# Patient Record
Sex: MALE | Race: WHITE
[De-identification: names, ages, dates, MRNs, and addresses within clinical notes are randomized per-mention and may not be internally consistent; named-entity substitution may affect disease eponyms.]

---

## 2017-10-01 ENCOUNTER — HOSPITAL ENCOUNTER (EMERGENCY)
Dept: HOSPITAL 47 - EC | Age: 22
Discharge: HOME | End: 2017-10-01
Payer: COMMERCIAL

## 2017-10-01 VITALS
TEMPERATURE: 98.3 F | DIASTOLIC BLOOD PRESSURE: 94 MMHG | RESPIRATION RATE: 20 BRPM | SYSTOLIC BLOOD PRESSURE: 143 MMHG | HEART RATE: 60 BPM

## 2017-10-01 DIAGNOSIS — H66.92: Primary | ICD-10-CM

## 2017-10-01 DIAGNOSIS — F17.200: ICD-10-CM

## 2017-10-01 PROCEDURE — 99283 EMERGENCY DEPT VISIT LOW MDM: CPT

## 2017-10-01 NOTE — ED
Pediatric HENT HPI





- General


Chief Complaint: ENT


Stated Complaint: L ear pain


Time Seen by Provider: 10/01/17 21:45


Source: patient, RN notes reviewed, old records reviewed


Mode of arrival: ambulatory


Limitations: no limitations





- History of Present Illness


Initial Comments: 





Pt is a 22 year old male with CC of left ear pain for one week. Patient reports 

that he felt  a popping and fullness in the ear, denies taking motrin or 

tylenol. Patient reports decreased hearing in left ear, and states that his 

neck feels tender and swollen. Patient denies any sore throat, and denies known 

fever. 





- Related Data


 Previous Rx's











 Medication  Instructions  Recorded


 


Amoxicillin 500 mg PO Q8H #21 capsule 10/01/17


 


Ciprofloxacin Ophth Soln [Ciloxan 5 drops LEFT EAR BID #1 bottle 10/01/17





0.3% Ophth Soln]  











 Allergies











Allergy/AdvReac Type Severity Reaction Status Date / Time


 


No Known Allergies Allergy   Verified 10/01/17 21:46














Review of Systems


ROS Statement: 


Those systems with pertinent positive or pertinent negative responses have been 

documented in the HPI.





ROS Other: All systems not noted in ROS Statement are negative.


Constitutional: Denies: chills


Eyes: Denies: eye pain, vision change


ENT: Reports: ear pain, hearing loss.  Denies: throat pain, dental pain, 

epistaxis


Respiratory: Denies: cough, dyspnea


Cardiovascular: Denies: chest pain, palpitations, dyspnea on exertion


Endocrine: Denies: fatigue


Gastrointestinal: Denies: abdominal pain, nausea


Genitourinary: Denies: urgency, dysuria


Musculoskeletal: Denies: back pain


Skin: Denies: rash, lesions


Neurological: Denies: headache, weakness


Psychiatric: Denies: anxiety, depression


Hematological/Lymphatic: Denies: easy bleeding





Past Medical History


Past Medical History: No Reported History


History of Any Multi-Drug Resistant Organisms: None Reported


Past Surgical History: No Surgical Hx Reported


Past Psychological History: No Psychological Hx Reported


Smoking Status: Current every day smoker


Past Alcohol Use History: Occasional


Past Drug Use History: None Reported





General Exam





- General Exam Comments


Initial Comments: 





This is a 22 year old male, no distress. 


Limitations: no limitations


General appearance: alert, in no apparent distress


Head exam: Present: atraumatic, normocephalic, normal inspection


Eye exam: Present: normal appearance, PERRL, EOMI.  Absent: scleral icterus, 

conjunctival injection, periorbital swelling


ENT exam: Present: normal oropharynx, mucous membranes moist, other (tender 

over left tragus).  Absent: normal exam, TM's normal bilaterally (left TM 

erythema and effusion)


Neck exam: Present: normal inspection.  Absent: tenderness, meningismus, 

lymphadenopathy


Respiratory exam: Present: normal lung sounds bilaterally.  Absent: respiratory 

distress, wheezes, rales, rhonchi, stridor


Cardiovascular Exam: Present: regular rate, normal rhythm, normal heart sounds.

  Absent: systolic murmur, diastolic murmur, rubs, gallop, clicks


GI/Abdominal exam: Present: soft, normal bowel sounds.  Absent: distended, 

tenderness, guarding, rebound, rigid


Neurological exam: Present: alert, oriented X3, CN II-XII intact


Psychiatric exam: Present: normal affect, normal mood


Skin exam: Present: warm, dry, intact, normal color.  Absent: rash





Course


 Vital Signs











  10/01/17





  21:46


 


Temperature 98.3 F


 


Pulse Rate 60


 


Respiratory 20





Rate 


 


Blood Pressure 143/94


 


O2 Sat by Pulse 100





Oximetry 














Medical Decision Making





- Medical Decision Making


Pt is a 22 year old male with CC of left ear pain for one week. Patient reports 

that he felt  a popping and fullness in the ear, denies taking motrin or 

tylenol. Patient reports decreased hearing in left ear, and states that his 

neck feels tender and swollen.  Patient has erythematous left TM with effusion, 

patient is tender over tragus. Patient placed on amoxil for otitis media. 

Discussed taking decongestant medication.Return parameters discussed. 








Disposition


Clinical Impression: 


 Left otitis media





Disposition: HOME SELF-CARE


Condition: Good


Instructions:  Otitis Media (ED)


Additional Instructions: 


patient advised to  over-the-counter decongestant medicine such as 

Tylenol Cold and sinus.  Take antibiotic as prescribed.  Also apply the 

antibiotic drops twice a day.  Return to the emergency department if any 

alarming signs or symptoms occur.


Prescriptions: 


Amoxicillin 500 mg PO Q8H #21 capsule


Ciprofloxacin Ophth Soln [Ciloxan 0.3% Ophth Soln] 5 drops LEFT EAR BID #1 

bottle


Referrals: 


None,Stated [Primary Care Provider] - 1-2 days


Time of Disposition: 22:23

## 2017-10-03 NOTE — CDI
Dear Neela Blood PA-C:



Please do addendum History of Present Illness, Physical Examination, and 
Medical Decision Making.



Thank you,



Sheron ANN,

.

If you have any questions, please contact  at [phone].
[facility]

## 2018-03-25 ENCOUNTER — HOSPITAL ENCOUNTER (EMERGENCY)
Dept: HOSPITAL 47 - EC | Age: 23
Discharge: HOME | End: 2018-03-25
Payer: COMMERCIAL

## 2018-03-25 VITALS — DIASTOLIC BLOOD PRESSURE: 60 MMHG | TEMPERATURE: 98.4 F | HEART RATE: 68 BPM | SYSTOLIC BLOOD PRESSURE: 118 MMHG

## 2018-03-25 VITALS — RESPIRATION RATE: 18 BRPM

## 2018-03-25 DIAGNOSIS — F17.200: ICD-10-CM

## 2018-03-25 DIAGNOSIS — Z20.828: ICD-10-CM

## 2018-03-25 DIAGNOSIS — Z53.8: ICD-10-CM

## 2018-03-25 DIAGNOSIS — J06.9: Primary | ICD-10-CM

## 2018-03-25 PROCEDURE — 71046 X-RAY EXAM CHEST 2 VIEWS: CPT

## 2018-03-25 PROCEDURE — 99283 EMERGENCY DEPT VISIT LOW MDM: CPT

## 2018-03-25 PROCEDURE — 87502 INFLUENZA DNA AMP PROBE: CPT

## 2018-03-25 RX ADMIN — OSELTAMIVIR PHOSPHATE STA MG: 75 CAPSULE ORAL at 22:02

## 2018-03-25 RX ADMIN — OSELTAMIVIR PHOSPHATE STA: 75 CAPSULE ORAL at 22:05

## 2018-03-25 NOTE — ED
URI HPI





- General


Chief Complaint: Upper Respiratory Infection


Stated Complaint: flu/burn on arm


Time Seen by Provider: 03/25/18 19:52


Source: patient, RN notes reviewed


Mode of arrival: ambulatory


Limitations: no limitations





- History of Present Illness


Initial Comments: 





This is a 22-year-old male who states he has a cough of green phlegm no overt 

fevers chills or sweats he does believe he may have contracted the fluids his 

son was diagnosed with a week ago.  No earache sore throat or rhinorrhea at 

this time no other symptoms to report.


MD Complaint: cough





- Related Data


 Previous Rx's











 Medication  Instructions  Recorded


 


Oseltamivir [Tamiflu] 75 mg PO Q12HR #14 cap 03/25/18











 Allergies











Allergy/AdvReac Type Severity Reaction Status Date / Time


 


No Known Allergies Allergy   Verified 03/25/18 20:34














Review of Systems


ROS Statement: 


Those systems with pertinent positive or pertinent negative responses have been 

documented in the HPI.





ROS Other: All systems not noted in ROS Statement are negative.





Past Medical History


Past Medical History: No Reported History


History of Any Multi-Drug Resistant Organisms: None Reported


Past Surgical History: No Surgical Hx Reported


Past Psychological History: No Psychological Hx Reported


Smoking Status: Current every day smoker


Past Alcohol Use History: Occasional


Past Drug Use History: None Reported





General Exam





- General Exam Comments


Initial Comments: 





This is a well-developed well-nourished awake alert oriented 3 male


Limitations: no limitations


General appearance: alert, in no apparent distress


Head exam: Present: atraumatic, normocephalic, normal inspection


Eye exam: Present: normal appearance, PERRL, EOMI.  Absent: scleral icterus, 

conjunctival injection, periorbital swelling


ENT exam: Present: normal exam, mucous membranes moist, other (Boggy nasal 

mucosa)


Neck exam: Present: normal inspection.  Absent: tenderness, meningismus, 

lymphadenopathy


Respiratory exam: Present: normal lung sounds bilaterally.  Absent: respiratory 

distress, wheezes, rales, rhonchi, stridor


Cardiovascular Exam: Present: regular rate, normal rhythm, normal heart sounds.

  Absent: systolic murmur, diastolic murmur, rubs, gallop, clicks


GI/Abdominal exam: Present: soft, normal bowel sounds.  Absent: distended, 

tenderness, guarding, rebound, rigid


Extremities exam: Present: normal inspection, full ROM, normal capillary 

refill.  Absent: tenderness, pedal edema, joint swelling, calf tenderness


Back exam: Present: normal inspection


Neurological exam: Present: alert, oriented X3, CN II-XII intact


Psychiatric exam: Present: normal affect, normal mood


Skin exam: Present: warm, dry, intact, normal color.  Absent: rash





Course


 Vital Signs











  03/25/18





  19:46


 


Temperature 99.1 F


 


Pulse Rate 73


 


Respiratory 18





Rate 


 


Blood Pressure 127/70


 


O2 Sat by Pulse 99





Oximetry 














Medical Decision Making





- Medical Decision Making





I did discuss findings with patient family the patient is exposed to influenza 

he has what appears be the beginning symptoms so his initial test was negative 

he'll be placed on for prophylaxis.  He does have a small infant at home.





- Lab Data


 Lab Results











  03/25/18 Range/Units





  20:30 


 


Influenza Type A RNA  Not Detected  (Not Detectd)  


 


Influenza Type B (PCR)  Not Detected  (Not Detectd)  














- Radiology Data


Radiology results: report reviewed (I did review the imaging and reports no 

acute findings.), image reviewed





Disposition


Clinical Impression: 


 Upper respiratory infection, Exposure to influenza





Disposition: HOME SELF-CARE


Condition: Good


Instructions:  Upper Respiratory Infection (ED), Influenza (ED)


Prescriptions: 


Oseltamivir [Tamiflu] 75 mg PO Q12HR #14 cap


Referrals: 


None,Stated [Primary Care Provider] - 1-2 days

## 2018-03-25 NOTE — XR
EXAMINATION: XR chest 2V

DATE AND TIME:  3/25/2018 8:23 PM

 

ORDERING PROVIDER: Marcus Landis MD

 

CLINICAL INDICATION: cough

 

TECHNIQUE: PA and lateral

 

COMPARISON: None.

 

DESCRIPTION: The lungs are clear. The pleural spaces are negative. The cardiac silhouette is not enla
rged. The mediastinal and pleural silhouettes are unremarkable. The skeletal structures are intact wi
thout focal findings. The soft tissues are unremarkable.

 

IMPRESSION:

NO ACUTE PROCESS.

## 2021-10-20 ENCOUNTER — HOSPITAL ENCOUNTER (EMERGENCY)
Dept: HOSPITAL 47 - EC | Age: 26
Discharge: HOME | End: 2021-10-20
Payer: COMMERCIAL

## 2021-10-20 VITALS
DIASTOLIC BLOOD PRESSURE: 78 MMHG | RESPIRATION RATE: 18 BRPM | TEMPERATURE: 98 F | SYSTOLIC BLOOD PRESSURE: 132 MMHG | HEART RATE: 79 BPM

## 2021-10-20 DIAGNOSIS — F17.200: ICD-10-CM

## 2021-10-20 DIAGNOSIS — K04.7: Primary | ICD-10-CM

## 2021-10-20 PROCEDURE — 99282 EMERGENCY DEPT VISIT SF MDM: CPT

## 2021-10-20 NOTE — ED
General Adult HPI





- General


Chief complaint: Dental/Oral


Stated complaint: Dental Pain


Time Seen by Provider: 10/20/21 22:04


Source: patient


Mode of arrival: ambulatory


Limitations: no limitations





- History of Present Illness


Initial comments: 


26-year-old male patient presents the emergency department today for evaluation 

of left upper until pain and facial swelling.  Patient states symptoms started 

about 5 days ago, swelling started today.  Denies any trismus or difficulty 

swallowing.  Denies any fever or chills.  Denies nausea or vomiting.  States his

been taken ibuprofen without relief.  They have been calling around to multiple 

dentists and unable to get him in anywhere.  They're calling daily to the 

[facility] dental clinic for an appointment.





- Related Data


                                  Previous Rx's











 Medication  Instructions  Recorded


 


Oseltamivir [Tamiflu] 75 mg PO Q12HR #14 cap 03/25/18


 


Amoxic-Pot Clav 875-125Mg 1 tab PO Q12HR #20 tablet 10/20/21





[Augmentin 875-125]  


 


Ibuprofen [Motrin] 600 mg PO Q8HR PRN #30 tab 10/20/21











                                    Allergies











Allergy/AdvReac Type Severity Reaction Status Date / Time


 


No Known Allergies Allergy   Verified 10/20/21 21:59














Review of Systems


ROS Statement: 


Those systems with pertinent positive or pertinent negative responses have been 

documented in the HPI.





ROS Other: All systems not noted in ROS Statement are negative.





Past Medical History


Past Medical History: No Reported History


History of Any Multi-Drug Resistant Organisms: None Reported


Past Surgical History: No Surgical Hx Reported


Past Psychological History: No Psychological Hx Reported


Smoking Status: Current some day smoker


Past Alcohol Use History: Occasional


Past Drug Use History: None Reported





General Exam


Limitations: no limitations


General appearance: alert, in no apparent distress, other (This is a well-

developed, well-nourished adult male patient in no acute distress.  Vital signs 

upon presentation are temperature 100.1F, pulse 89, respirations 19, blood 

pressure 135/75, pulse ox 97% on room air.)


Eye exam: Present: normal appearance, PERRL, EOMI.  Absent: scleral icterus, 

conjunctival injection, periorbital swelling


ENT exam: Present: normal oropharynx, mucous membranes moist, other (There are 

multiple dental caries noted to the left upper dentition.  There is no evidence 

for drainable abscess.)


Cardiovascular Exam: Present: regular rate, normal rhythm, normal heart sounds. 

 Absent: systolic murmur, diastolic murmur, rubs, gallop, clicks


GI/Abdominal exam: Present: soft, normal bowel sounds.  Absent: distended, 

tenderness, guarding, rebound, rigid


Neurological exam: Present: alert, oriented X3, CN II-XII intact


Psychiatric exam: Present: normal affect, normal mood


Skin exam: Present: warm, dry, intact, normal color.  Absent: rash





Course


                                   Vital Signs











  10/20/21 10/20/21





  21:56 22:22


 


Temperature 100.1 F H 98 F


 


Pulse Rate 89 79


 


Respiratory 19 18





Rate  


 


Blood Pressure 135/75 132/78


 


O2 Sat by Pulse 97 97





Oximetry  














Medical Decision Making





- Medical Decision Making


26-year-old male patient presents to the emergency department today for 

evaluation of left upper dental pain and facial swelling.  No evidence for 

drainable abscess on exam.  He does have mildly elevated temperature 100.1.  He 

was started on antibiotics here.  Given pain medication.  He was also given 

recommendation to call the [facility] as a do except a wide variety 

of insurances.  He is instructed to continue colonic [facility] dental clinic 

daily for an emergency appointment.  Return parameters were discussed in detail.

  He verbalizes understanding and agrees with this plan.  My attending is Dr. Beth. 








Disposition


Clinical Impression: 


 Dental abscess





Disposition: HOME SELF-CARE


Condition: Good


Instructions (If sedation given, give patient instructions):  Dental Abscess 

(ED)


Additional Instructions: 


Take edications as directed. Complete antibiotic prescription in full. Follow-up

 with dentistry as soon as possible.  Return to the emergency department for any

 new, worsening, or concerning symptoms.


Prescriptions: 


Amoxic-Pot Clav 875-125Mg [Augmentin 875-125] 1 tab PO Q12HR #20 tablet


Ibuprofen [Motrin] 600 mg PO Q8HR PRN #30 tab


 PRN Reason: Pain


Is patient prescribed a controlled substance at d/c from ED?: No


Referrals: 


None,Stated [Primary Care Provider] - 1-2 days


Time of Disposition: 22:14

## 2022-06-03 ENCOUNTER — HOSPITAL ENCOUNTER (EMERGENCY)
Dept: HOSPITAL 47 - EC | Age: 27
Discharge: HOME | End: 2022-06-03
Payer: COMMERCIAL

## 2022-06-03 VITALS — SYSTOLIC BLOOD PRESSURE: 113 MMHG | HEART RATE: 73 BPM | DIASTOLIC BLOOD PRESSURE: 74 MMHG | RESPIRATION RATE: 18 BRPM

## 2022-06-03 VITALS — TEMPERATURE: 98.5 F

## 2022-06-03 DIAGNOSIS — F17.200: ICD-10-CM

## 2022-06-03 DIAGNOSIS — T15.02XA: Primary | ICD-10-CM

## 2022-06-03 PROCEDURE — 65220 REMOVE FOREIGN BODY FROM EYE: CPT

## 2022-06-03 PROCEDURE — 99283 EMERGENCY DEPT VISIT LOW MDM: CPT

## 2022-06-03 NOTE — ED
Eye Problem HPI





- General


Chief complaint: Eye Problems


Stated complaint: Left Eye pain


Time Seen by Provider: 06/03/22 13:16


Source: patient, RN notes reviewed


Mode of arrival: ambulatory


Limitations: no limitations





- History of Present Illness


Initial comments: 


27-year-old male presents emergency Department with chief complaint of left eye 

foreign body, left eye irritation.  Patient states he felt like something has 

been his left eye for 1 week.  He states he has no visual disturbance states it 

is sensitive to light, feels like something is in his left eye states that there

is irritation, tearing of his eye.  His tetanus is up-to-date patient offers no 

complaints.








- Related Data


                                  Previous Rx's











 Medication  Instructions  Recorded


 


Oseltamivir [Tamiflu] 75 mg PO Q12HR #14 cap 03/25/18


 


Amoxic-Pot Clav 875-125Mg 1 tab PO Q12HR #20 tablet 10/20/21





[Augmentin 875-125]  


 


Ibuprofen [Motrin] 600 mg PO Q8HR PRN #30 tab 10/20/21











                                    Allergies











Allergy/AdvReac Type Severity Reaction Status Date / Time


 


No Known Allergies Allergy   Verified 06/03/22 13:13














Review of Systems


ROS Statement: 


Those systems with pertinent positive or pertinent negative responses have been 

documented in the HPI.





ROS Other: All systems not noted in ROS Statement are negative.





Past Medical History


Past Medical History: No Reported History


History of Any Multi-Drug Resistant Organisms: None Reported


Past Surgical History: No Surgical Hx Reported


Past Psychological History: No Psychological Hx Reported


Smoking Status: Current some day smoker


Past Alcohol Use History: Occasional


Past Drug Use History: None Reported





General Exam


Limitations: no limitations


General appearance: alert, in no apparent distress


Head exam: Present: atraumatic, normocephalic, normal inspection


Eye exam: Present: PERRL, EOMI, conjunctival injection (Left), other (Foreign-

body central region left eye, patient has fluorescein uptake with Wood lamp, 

patient for relief of symptoms after proparacaine drops).  Absent: normal 

appearance, scleral icterus, periorbital swelling


ENT exam: Present: normal exam, normal oropharynx, mucous membranes moist, TM's 

normal bilaterally


Neck exam: Present: normal inspection.  Absent: tenderness, meningismus, 

lymphadenopathy


Respiratory exam: Present: normal lung sounds bilaterally.  Absent: respiratory 

distress, wheezes, rales, rhonchi, stridor


Cardiovascular Exam: Present: regular rate, normal rhythm, normal heart sounds. 

 Absent: systolic murmur, diastolic murmur, rubs, gallop, clicks





Course


                                   Vital Signs











  06/03/22





  13:10


 


Temperature 98.5 F


 


Pulse Rate 80


 


Respiratory 20





Rate 


 


Blood Pressure 118/67


 


O2 Sat by Pulse 99





Oximetry 














Procedures





- Forgein Body Removal Eye


Site: Left


Anesthetic Used: Proparacaine


Eye Exam Technique: Woods Lamp


Foreign Body Suspected: Other


Forgein Body Removal Technique: Cotton Swab


Remaining Debris: No


Patient Tolerated: no complications





Medical Decision Making





- Medical Decision Making





Foreign body was removed from left eye, there is a corneal defect patient was 

placed on Tobrex eyedrops patient will follow-up with on-call ophthalmology 

return parameters were discussed.





Disposition


Clinical Impression: 


 Foreign body in cornea, left eye, initial encounter





Disposition: HOME SELF-CARE


Condition: Stable


Instructions (If sedation given, give patient instructions):  Corneal Abrasion 

(ED), Eye Foreign Body (ED)


Additional Instructions: 


Use Tobrex eyedrops 1 drop every 4 hours for 5 days.Please return to the 

Emergency Department if symptoms worsen or any other concerns.


Is patient prescribed a controlled substance at d/c from ED?: No


Referrals: 


None,Stated [Primary Care Provider] - 1-2 days


Isael Amanda MD [STAFF PHYSICIAN] - 1-2 days


Time of Disposition: 13:34

## 2022-09-09 ENCOUNTER — HOSPITAL ENCOUNTER (EMERGENCY)
Dept: HOSPITAL 47 - EC | Age: 27
Discharge: HOME | End: 2022-09-09
Payer: COMMERCIAL

## 2022-09-09 VITALS
HEART RATE: 64 BPM | SYSTOLIC BLOOD PRESSURE: 120 MMHG | TEMPERATURE: 98 F | RESPIRATION RATE: 20 BRPM | DIASTOLIC BLOOD PRESSURE: 65 MMHG

## 2022-09-09 DIAGNOSIS — F17.200: ICD-10-CM

## 2022-09-09 DIAGNOSIS — K08.89: Primary | ICD-10-CM

## 2022-09-09 PROCEDURE — 99282 EMERGENCY DEPT VISIT SF MDM: CPT

## 2022-09-09 NOTE — ED
General Adult HPI





- General


Chief complaint: Dental/Oral


Stated complaint: dental pain


Time Seen by Provider: 09/09/22 12:37


Source: patient


Mode of arrival: ambulatory


Limitations: no limitations





- History of Present Illness


Initial comments: 


Dictation was produced using dragon dictation software. please excuse any 

grammatical, word or spelling errors. 











Chief Complaint: 27-year-old male presents to the emergency department for 

dental pain





History of Present Illness: Patient is a 27-year-old male with no significant 

comorbidities.  Presents emergency Department with dental pain.  Patient states 

that #17 hurts.  It's been hurting for 7 days.  Patient is a history of poor 

dentition and dental extractions in the past.  Denies any fever or facial pain. 

Patient had his significant other called multiple dental offices and 

appointments have been delayed for up to 3 weeks.  Patient spent on antibiotics 

in the past for dental pain.








The ROS documented in this emergency department record has been reviewed and 

confirmed by me.  Those systems with pertinent positive or negative responses 

have been documented in the HPI.  All other systems are other negative and/or 

noncontributory.








PHYSICAL EXAM:


General Impression: Alert and oriented x3, not in acute distress


HEENT: Normocephalic atraumatic, extra-ocular movements intact, pupils equal and

reactive to light bilaterally, mucous membranes moist.


Oral exam: Poor dentition, no drainable abscess in the lower left mandibular 

gingiva


Cardiovascular: Heart regular rate and rhythm


Chest: Able to complete full sentences, no retractions, no tachypnea


Musculoskeletal: Pulses present and equal in all extremities, no peripheral 

edema


Motor:  no focal deficits noted


Neurological: CN II-XII grossly intact, no focal motor or sensory deficits noted


Skin: Intact with no visualized rashes


Psych: Normal affect and mood





ED course: 27-year-old male presents to the emergency department for dental 

pain.  Vital signs upon arrival are within acceptable limits.  Patient in no 

acute distress.  Patient given antibiotics.  Advised to follow up with Dentist. 

Patient given prescription for antibiotics and analgesics.  

















- Related Data


                                  Previous Rx's











 Medication  Instructions  Recorded


 


Oseltamivir [Tamiflu] 75 mg PO Q12HR #14 cap 03/25/18


 


Amoxic-Pot Clav 875-125Mg 1 tab PO Q12HR #20 tablet 10/20/21





[Augmentin 875-125]  


 


Ibuprofen [Motrin] 600 mg PO Q8HR PRN #30 tab 10/20/21


 


Amoxic-Pot Clav 875-125Mg 1 tab PO BID 21 Days #42 tab 09/09/22





[Augmentin 875-125]  


 


HYDROcodone/APAP 5-325MG [Norco 1 tab PO Q6HR PRN 3 Days #12 tab 09/09/22





5-325]  











                                    Allergies











Allergy/AdvReac Type Severity Reaction Status Date / Time


 


No Known Allergies Allergy   Verified 09/09/22 12:36














Review of Systems


ROS Statement: 


Those systems with pertinent positive or pertinent negative responses have been 

documented in the HPI.





ROS Other: All systems not noted in ROS Statement are negative.





Past Medical History


Past Medical History: No Reported History


History of Any Multi-Drug Resistant Organisms: None Reported


Past Surgical History: No Surgical Hx Reported


Past Psychological History: No Psychological Hx Reported


Smoking Status: Current every day smoker


Past Alcohol Use History: Occasional


Past Drug Use History: Marijuana





General Exam


Limitations: no limitations





Course





                                   Vital Signs











  09/09/22





  12:35


 


Temperature 98 F


 


Pulse Rate 64


 


Respiratory 20





Rate 


 


Blood Pressure 120/65


 


O2 Sat by Pulse 99





Oximetry 














Disposition


Clinical Impression: 


 Pain, dental





Disposition: HOME SELF-CARE


Condition: Fair


Instructions (If sedation given, give patient instructions):  Toothache (ED)


Prescriptions: 


Amoxic-Pot Clav 875-125Mg [Augmentin 875-125] 1 tab PO BID 21 Days #42 tab


HYDROcodone/APAP 5-325MG [Norco 5-325] 1 tab PO Q6HR PRN 3 Days #12 tab


 PRN Reason: Severe Pain


Is patient prescribed a controlled substance at d/c from ED?: Yes


If prescribed controlled substance>3 days was MAPS reviewed?: Prescribed <3 Days


Referrals: 


None,Stated [Primary Care Provider] - 1-2 days


Time of Disposition: 12:55

## 2024-12-04 ENCOUNTER — HOSPITAL ENCOUNTER (EMERGENCY)
Dept: HOSPITAL 47 - EC | Age: 29
Discharge: HOME | End: 2024-12-04
Payer: SELF-PAY

## 2024-12-04 VITALS
HEART RATE: 68 BPM | DIASTOLIC BLOOD PRESSURE: 81 MMHG | RESPIRATION RATE: 18 BRPM | TEMPERATURE: 98.1 F | SYSTOLIC BLOOD PRESSURE: 114 MMHG

## 2024-12-04 DIAGNOSIS — Z23: ICD-10-CM
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
## 2024-12-04 DIAGNOSIS — T15.01XA: Primary | ICD-10-CM

## 2024-12-04 DIAGNOSIS — F17.200: ICD-10-CM

## 2024-12-04 PROCEDURE — 99283 EMERGENCY DEPT VISIT LOW MDM: CPT

## 2024-12-04 PROCEDURE — 90471 IMMUNIZATION ADMIN: CPT

## 2024-12-04 PROCEDURE — 65220 REMOVE FOREIGN BODY FROM EYE: CPT

## 2024-12-04 PROCEDURE — 90715 TDAP VACCINE 7 YRS/> IM: CPT

## 2024-12-04 RX ADMIN — FLUORESCEIN SODIUM ONE MG: 1 STRIP OPHTHALMIC at 09:22

## 2024-12-04 RX ADMIN — TOBRAMYCIN STA DROPS: 3 SOLUTION OPHTHALMIC at 09:53

## 2024-12-04 RX ADMIN — PROPARACAINE HYDROCHLORIDE STA DROPS: 5 SOLUTION/ DROPS OPHTHALMIC at 09:22

## 2024-12-04 RX ADMIN — TETANUS TOXOID, REDUCED DIPHTHERIA TOXOID AND ACELLULAR PERTUSSIS VACCINE, ADSORBED ONE ML: 5; 2.5; 8; 8; 2.5 SUSPENSION INTRAMUSCULAR at 09:53

## 2024-12-04 NOTE — ED
ENT HPI





- General


Chief complaint: ENT


Stated complaint: object in rt eye


Time Seen by Provider: 12/04/24 08:59


Source: patient, RN notes reviewed


Mode of arrival: ambulatory


Limitations: no limitations





- History of Present Illness


Initial comments: 


29-year-old male presents to the emergency department with chief complaint of 

foreign body in right eye.  He states that he was working on a truck and a piece

of rust fell off into his eye.  He reports pain and tearing but denies vision 

changes or floaters.  Denies other trauma to face or eye.  States that his last 

tetanus shot was 7 years ago.  Denies other complaints





- Related Data


                                  Previous Rx's











 Medication  Instructions  Recorded


 


Oseltamivir [Tamiflu] 75 mg PO Q12HR #14 cap 03/25/18


 


Amoxic-Pot Clav 875-125Mg 1 tab PO Q12HR #20 tablet 10/20/21





[Augmentin 875-125]  


 


Ibuprofen [Motrin] 600 mg PO Q8HR PRN #30 tab 10/20/21


 


Amoxic-Pot Clav 875-125Mg 1 tab PO BID 21 Days #42 tab 09/09/22





[Augmentin 875-125]  


 


HYDROcodone/APAP 5-325MG [Norco 1 tab PO Q6HR PRN 3 Days #12 tab 09/09/22





5-325]  











                                    Allergies











Allergy/AdvReac Type Severity Reaction Status Date / Time


 


No Known Allergies Allergy   Verified 12/04/24 09:14














Review of Systems


ROS Statement: 


Those systems with pertinent positive or pertinent negative responses have been 

documented in the HPI.





ROS Other: All systems not noted in ROS Statement are negative.





Past Medical History


Past Medical History: No Reported History


History of Any Multi-Drug Resistant Organisms: None Reported


Past Surgical History: No Surgical Hx Reported


Past Psychological History: No Psychological Hx Reported


Smoking Status: Current every day smoker


Past Alcohol Use History: Occasional


Past Drug Use History: Marijuana





General Exam


Limitations: no limitations


General appearance: alert, in no apparent distress


Head exam: Present: atraumatic, normocephalic, normal inspection


Eye exam: Present: PERRL, EOMI.  Absent: normal appearance (Foreign body 4 

o'clock position), scleral icterus, conjunctival injection, periorbital swelling


  ** Expanded


Pupils: Regular, Round: Bilateral, Reactive: Bilateral


Sclera/Conjunctival: Injection: Right, Foreign Body: Right (metal with rust 4 

o'clock position)


Visual acuity (R) = 20/: 20


Visual acuity (L) = 20/: 20


With correction: No


ENT exam: Present: normal exam, mucous membranes moist


Neck exam: Present: normal inspection.  Absent: tenderness, meningismus, 

lymphadenopathy


Respiratory exam: Present: normal lung sounds bilaterally.  Absent: respiratory 

distress, wheezes, rales, rhonchi, stridor


Cardiovascular Exam: Present: regular rate, normal rhythm, normal heart sounds. 

Absent: systolic murmur, diastolic murmur, rubs, gallop, clicks


GI/Abdominal exam: Present: soft, normal bowel sounds.  Absent: distended, 

tenderness, guarding, rebound, rigid


Extremities exam: Present: normal inspection, full ROM, normal capillary refill.

 Absent: tenderness, pedal edema, joint swelling, calf tenderness


Back exam: Present: normal inspection


Neurological exam: Present: alert, oriented X3, CN II-XII intact


Psychiatric exam: Present: normal affect, normal mood


Skin exam: Present: warm, dry, intact, normal color.  Absent: rash





Course


                                   Vital Signs











  12/04/24 12/04/24





  09:12 09:56


 


Temperature 98.2 F 98.1 F


 


Pulse Rate 67 68


 


Respiratory 20 18





Rate  


 


Blood Pressure 116/75 114/81


 


O2 Sat by Pulse 99 99





Oximetry  














Procedures





- Forgein Body Removal Eye


Site: Right


Anesthetic Used: Proparacaine


Eye Exam Technique: Woods Lamp, Fluorescein


Foreign Body Suspected: Metal


Forgein Body Removal Technique: Cotton Swab, Irrigation, [place]


Remaining Debris: No


Patient Tolerated: no complications





Medical Decision Making





- Medical Decision Making


Was pt. sent in by a medical professional or institution ([facility]MON[facility] Colon, NP, urgent 

care, hospital, or nursing home...) When possible be specific


@ -No


Did you speak to anyone other than the patient for history (EMS, parent, family,

police, friend...)? What history was obtained from this source 


@ -No


Did you review nursing and triage notes (agree or disagree)? Why? 


@ -I reviewed and agree with nursing and triage notes


Were old charts reviewed (outside hosp., previous admission, EMS record, old 

EKG, old radiological studies, urgent care reports/EKG's, nursing home records)?

Report findings 


@ -No old charts were reviewed


Differential Diagnosis (chest pain, altered mental status, abdominal pain women,

abdominal pain men, vaginal bleeding, weakness, fever, dyspnea, syncope, 

headache, dizziness, GI bleed, back pain, seizure, CVA, palpatations, mental 

health, musculoskeletal)? 


@ -Corneal foreign body, corneal abrasion, conjunctivitis


EKG interpreted by me (3pts min.).


@ -None


X-rays interpreted by me (1pt min.).


@ -None done


CT interpreted by me (1pt min.).


@ -None done


U/S interpreted by me (1pt. min.).


@ -None done


What testing was considered but not performed or refused? (CT, X-rays, U/S, 

labs)? Why?


@ -None


What meds were considered but not given or refused? Why?


@ -None


Did you discuss the management of the patient with other professionals 

(professionals i.e. , PA, NP, lab, RT, psych nurse, , , 

teacher, , )? Give summary


@ -No


Was smoking cessation discussed for >3mins.?


@ -No


Was critical care preformed (if so, how long)?


@ -No


Were there social determinants of health that impacted care today? How? 

(Homelessness, low income, unemployed, alcoholism, drug addiction, 

transportation, low edu. Level, literacy, decrease access to med. care, [facility], 

rehab)?


@ -No


Was there de-escalation of care discussed even if they declined (Discuss DNR or 

withdrawal of care, Hospice)? DNR status


@ -No


What co-morbidities impacted this encounter? (DM, HTN, Smoking, COPD, CAD, 

Cancer, CVA, ARF, Chemo, Hep., AIDS, mental health diagnosis, sleep apnea, 

morbid obesity)?


@ -None


Was patient admitted / discharged? Hospital course, mention meds given and 

route, prescriptions, significant lab abnormalities, going to OR and other 

pertinent info.


@ -[Does charge corneal foreign body was removed with no complication rust ring 

removed patient's tetanus updated discharged on Tobrex eyedrops and follow-up 

with ophthalmology


Undiagnosed new problem with uncertain prognosis?


@ -No


Drug Therapy requiring intensive monitoring for toxicity (Heparin, Nitro, 

Insulin, Cardizem)?


@ -No


Were any procedures done?


@ -No


Diagnosis/symptom?


@ -Corneal foreign body


Acute, or Chronic, or Acute on Chronic?


@ -Acute


Uncomplicated (without systemic symptoms) or Complicated (systemic symptoms)?


@ -Uncomplicated


Side effects of treatment?


@ -No


Exacerbation, Progression, or Severe Exacerbation?


@ -No


Poses a threat to life or bodily function? How? (Chest pain, [place], MI, pneumonia,

 PE, COPD, DKA, ARF, appy, cholecystitis, CVA, Diverticulitis, Homicidal, 

Suicidal, threat to staff... and all critical care pts)


@ -No








Disposition


Clinical Impression: 


 Corneal foreign body





Disposition: HOME SELF-CARE


Condition: Stable


Instructions (If sedation given, give patient instructions):  Eye Foreign Body 

(ED)


Additional Instructions: 


Use Tobrex eyedrops 1 drop every 4 hours for 5 days please return to the 

Emergency Department if symptoms worsen or any other concerns.


Is patient prescribed a controlled substance at d/c from ED?: No


Referrals: 


None,Stated [Primary Care Provider] - 1-2 days


Isael Amanda MD [STAFF PHYSICIAN] - 1-2 days


Time of Disposition: 09:45